# Patient Record
Sex: MALE | Race: WHITE | NOT HISPANIC OR LATINO | URBAN - METROPOLITAN AREA
[De-identification: names, ages, dates, MRNs, and addresses within clinical notes are randomized per-mention and may not be internally consistent; named-entity substitution may affect disease eponyms.]

---

## 2017-10-17 ENCOUNTER — OFFICE (OUTPATIENT)
Dept: URBAN - METROPOLITAN AREA CLINIC 33 | Facility: CLINIC | Age: 72
End: 2017-10-17

## 2017-10-17 VITALS
TEMPERATURE: 97.2 F | WEIGHT: 156 LBS | HEIGHT: 70 IN | HEART RATE: 63 BPM | DIASTOLIC BLOOD PRESSURE: 91 MMHG | SYSTOLIC BLOOD PRESSURE: 142 MMHG

## 2017-10-17 DIAGNOSIS — K21.9 GASTRO-ESOPHAGEAL REFLUX DISEASE WITHOUT ESOPHAGITIS: ICD-10-CM

## 2017-10-17 DIAGNOSIS — R07.89 OTHER CHEST PAIN: ICD-10-CM

## 2017-10-17 PROCEDURE — 99214 OFFICE O/P EST MOD 30 MIN: CPT

## 2017-10-17 RX ORDER — PANTOPRAZOLE SODIUM 40 MG/1
40 TABLET, DELAYED RELEASE ORAL
Qty: 30 | Refills: 5 | Status: COMPLETED
Start: 2017-10-17 | End: 2018-01-09

## 2018-01-09 ENCOUNTER — OFFICE (OUTPATIENT)
Dept: URBAN - METROPOLITAN AREA CLINIC 33 | Facility: CLINIC | Age: 73
End: 2018-01-09

## 2018-01-09 VITALS
HEIGHT: 70 IN | DIASTOLIC BLOOD PRESSURE: 85 MMHG | SYSTOLIC BLOOD PRESSURE: 150 MMHG | TEMPERATURE: 97 F | HEART RATE: 81 BPM | WEIGHT: 150 LBS

## 2018-01-09 DIAGNOSIS — K62.89 OTHER SPECIFIED DISEASES OF ANUS AND RECTUM: ICD-10-CM

## 2018-01-09 DIAGNOSIS — K64.8 OTHER HEMORRHOIDS: ICD-10-CM

## 2018-01-09 PROCEDURE — 99214 OFFICE O/P EST MOD 30 MIN: CPT

## 2018-05-09 ENCOUNTER — ON CAMPUS - OUTPATIENT (OUTPATIENT)
Dept: URBAN - METROPOLITAN AREA HOSPITAL 35 | Facility: HOSPITAL | Age: 73
End: 2018-05-09
Payer: COMMERCIAL

## 2018-05-09 DIAGNOSIS — D12.3 BENIGN NEOPLASM OF TRANSVERSE COLON: ICD-10-CM

## 2018-05-09 DIAGNOSIS — K62.89 OTHER SPECIFIED DISEASES OF ANUS AND RECTUM: ICD-10-CM

## 2018-05-09 PROCEDURE — 45380 COLONOSCOPY AND BIOPSY: CPT

## 2020-03-05 ENCOUNTER — OFFICE (OUTPATIENT)
Dept: URBAN - METROPOLITAN AREA CLINIC 34 | Facility: CLINIC | Age: 75
End: 2020-03-05

## 2020-03-05 VITALS
DIASTOLIC BLOOD PRESSURE: 84 MMHG | TEMPERATURE: 97.8 F | WEIGHT: 158 LBS | HEIGHT: 70 IN | SYSTOLIC BLOOD PRESSURE: 137 MMHG | HEART RATE: 63 BPM

## 2020-03-05 DIAGNOSIS — K21.9 GASTRO-ESOPHAGEAL REFLUX DISEASE WITHOUT ESOPHAGITIS: ICD-10-CM

## 2020-03-05 DIAGNOSIS — R07.89 OTHER CHEST PAIN: ICD-10-CM

## 2020-03-05 PROCEDURE — 99214 OFFICE O/P EST MOD 30 MIN: CPT | Performed by: INTERNAL MEDICINE

## 2020-03-05 RX ORDER — OMEPRAZOLE 40 MG/1
CAPSULE, DELAYED RELEASE ORAL
Qty: 30 | Refills: 5 | Status: COMPLETED
Start: 2020-03-05 | End: 2022-01-03

## 2021-02-01 ENCOUNTER — ON CAMPUS - OUTPATIENT (OUTPATIENT)
Dept: URBAN - METROPOLITAN AREA HOSPITAL 37 | Facility: HOSPITAL | Age: 76
End: 2021-02-01

## 2021-02-01 DIAGNOSIS — K21.9 GASTRO-ESOPHAGEAL REFLUX DISEASE WITHOUT ESOPHAGITIS: ICD-10-CM

## 2021-02-01 PROCEDURE — 43235 EGD DIAGNOSTIC BRUSH WASH: CPT | Performed by: INTERNAL MEDICINE

## 2022-01-03 ENCOUNTER — OFFICE (OUTPATIENT)
Dept: URBAN - METROPOLITAN AREA CLINIC 102 | Facility: CLINIC | Age: 77
End: 2022-01-03

## 2022-01-03 VITALS
HEIGHT: 70 IN | WEIGHT: 156 LBS | SYSTOLIC BLOOD PRESSURE: 156 MMHG | DIASTOLIC BLOOD PRESSURE: 81 MMHG | HEART RATE: 67 BPM | TEMPERATURE: 96.8 F

## 2022-01-03 DIAGNOSIS — R10.13 EPIGASTRIC PAIN: ICD-10-CM

## 2022-01-03 PROCEDURE — 99204 OFFICE O/P NEW MOD 45 MIN: CPT | Performed by: PHYSICIAN ASSISTANT

## 2022-01-03 NOTE — SERVICEHPINOTES
Mr. Joshi is here to discuss GERD. He was here last year for a burning pain in the epigastrium. He underwent an EGD 02/2021 which showed esophagitis, bx not taken. He took Pantoprazole for about 2.5 months and then stopped it.  Pain in the epigastrium began about 4-6 weeks ago. He resumed the Pantoprazole about 2 weeks ago and is already feeling much better. Drinking soda will flare up this pain as well drinking iced tea. No regular NSAID use. No tobacco use. Normal appetite and no early satiety. No weight loss, infact weight gain. No nausea/vomiting. No dark stool or dysphagia. Under a lot of stress. No other GI related complaints today.

## 2022-02-10 ENCOUNTER — OFFICE (OUTPATIENT)
Dept: URBAN - METROPOLITAN AREA CLINIC 34 | Facility: CLINIC | Age: 77
End: 2022-02-10

## 2022-02-10 VITALS
SYSTOLIC BLOOD PRESSURE: 144 MMHG | TEMPERATURE: 98.1 F | DIASTOLIC BLOOD PRESSURE: 87 MMHG | WEIGHT: 158 LBS | HEART RATE: 84 BPM | HEIGHT: 70 IN

## 2022-02-10 DIAGNOSIS — K21.9 GASTRO-ESOPHAGEAL REFLUX DISEASE WITHOUT ESOPHAGITIS: ICD-10-CM

## 2022-02-10 DIAGNOSIS — R10.13 EPIGASTRIC PAIN: ICD-10-CM

## 2022-02-10 PROCEDURE — 99213 OFFICE O/P EST LOW 20 MIN: CPT | Performed by: PHYSICIAN ASSISTANT

## 2022-02-10 NOTE — SERVICEHPINOTES
Mr. Joshi is here for f/u regarding epigastric pain (burning).   He underwent an EGD 02/2021 which showed esophagitis, bx not taken. He took Pantoprazole for about 2.5 months and then stopped it.  Pain in the epigastrium began again about 2 months ago. He resumed the Pantoprazole about 6 weeks ago and is already feeling much better. At the last visit, we discussed diet/lifestyle modifications in great detail. No regular NSAID use. No tobacco use. Normal appetite and no early satiety. No weight loss, infact weight gain. No nausea/vomiting. No dark stool or dysphagia. Under a lot of stress. As he was improving, we decided to give the medication more time to work and then do a f/u. At this point, the pain is gone. He drank a soda the other day and the pain returned. He can now drink ice tea without any problems. Soda seems to be the only thing that will induce the pain these days. Otherwise is he is completely asymptomatic. No other GI related complaints today.

## 2023-06-20 ENCOUNTER — OFFICE (OUTPATIENT)
Dept: URBAN - METROPOLITAN AREA CLINIC 34 | Facility: CLINIC | Age: 78
End: 2023-06-20

## 2023-06-20 VITALS
DIASTOLIC BLOOD PRESSURE: 81 MMHG | TEMPERATURE: 97.5 F | WEIGHT: 152 LBS | SYSTOLIC BLOOD PRESSURE: 132 MMHG | HEART RATE: 86 BPM | HEIGHT: 69 IN

## 2023-06-20 DIAGNOSIS — K21.9 GASTRO-ESOPHAGEAL REFLUX DISEASE WITHOUT ESOPHAGITIS: ICD-10-CM

## 2023-06-20 DIAGNOSIS — K62.89 OTHER SPECIFIED DISEASES OF ANUS AND RECTUM: ICD-10-CM

## 2023-06-20 DIAGNOSIS — Z86.010 PERSONAL HISTORY OF COLONIC POLYPS: ICD-10-CM

## 2023-06-20 PROCEDURE — 99214 OFFICE O/P EST MOD 30 MIN: CPT | Performed by: INTERNAL MEDICINE

## 2023-06-20 RX ORDER — FAMOTIDINE 40 MG/1
TABLET, FILM COATED ORAL
Qty: 60 | Refills: 10 | Status: COMPLETED
Start: 2023-06-20 | End: 2024-01-18

## 2023-07-21 ENCOUNTER — AMBULATORY SURGICAL CENTER (OUTPATIENT)
Dept: URBAN - METROPOLITAN AREA SURGERY 23 | Facility: SURGERY | Age: 78
End: 2023-07-21
Payer: COMMERCIAL

## 2023-07-21 DIAGNOSIS — K59.9 FUNCTIONAL INTESTINAL DISORDER, UNSPECIFIED: ICD-10-CM

## 2023-07-21 DIAGNOSIS — Z86.010 PERSONAL HISTORY OF COLONIC POLYPS: ICD-10-CM

## 2023-07-21 DIAGNOSIS — K57.30 DIVERTICULOSIS OF LARGE INTESTINE WITHOUT PERFORATION OR ABS: ICD-10-CM

## 2023-07-21 PROCEDURE — 45380 COLONOSCOPY AND BIOPSY: CPT | Performed by: INTERNAL MEDICINE

## 2024-01-18 ENCOUNTER — OFFICE (OUTPATIENT)
Dept: URBAN - METROPOLITAN AREA CLINIC 79 | Facility: CLINIC | Age: 79
End: 2024-01-18

## 2024-01-18 VITALS
WEIGHT: 149 LBS | HEIGHT: 69 IN | TEMPERATURE: 98.4 F | SYSTOLIC BLOOD PRESSURE: 137 MMHG | HEART RATE: 56 BPM | DIASTOLIC BLOOD PRESSURE: 70 MMHG

## 2024-01-18 DIAGNOSIS — K62.89 OTHER SPECIFIED DISEASES OF ANUS AND RECTUM: ICD-10-CM

## 2024-01-18 DIAGNOSIS — R10.13 EPIGASTRIC PAIN: ICD-10-CM

## 2024-01-18 PROCEDURE — 99214 OFFICE O/P EST MOD 30 MIN: CPT | Performed by: PHYSICIAN ASSISTANT

## 2024-06-05 ENCOUNTER — AMBULATORY SURGICAL CENTER (OUTPATIENT)
Dept: URBAN - METROPOLITAN AREA SURGERY 23 | Facility: SURGERY | Age: 79
End: 2024-06-05
Payer: MEDICARE

## 2024-06-05 ENCOUNTER — OFFICE (OUTPATIENT)
Dept: URBAN - METROPOLITAN AREA PATHOLOGY 3 | Facility: PATHOLOGY | Age: 79
End: 2024-06-05
Payer: MEDICARE

## 2024-06-05 DIAGNOSIS — R10.13 EPIGASTRIC PAIN: ICD-10-CM

## 2024-06-05 DIAGNOSIS — K31.89 OTHER DISEASES OF STOMACH AND DUODENUM: ICD-10-CM

## 2024-06-05 DIAGNOSIS — K21.00 GASTRO-ESOPHAGEAL REFLUX DISEASE WITH ESOPHAGITIS, WITHOUT B: ICD-10-CM

## 2024-06-05 PROCEDURE — 88305 TISSUE EXAM BY PATHOLOGIST: CPT | Performed by: PATHOLOGY

## 2024-06-05 PROCEDURE — 88313 SPECIAL STAINS GROUP 2: CPT | Performed by: PATHOLOGY

## 2024-06-05 PROCEDURE — 88312 SPECIAL STAINS GROUP 1: CPT | Performed by: PATHOLOGY

## 2024-06-05 PROCEDURE — 43239 EGD BIOPSY SINGLE/MULTIPLE: CPT | Performed by: INTERNAL MEDICINE
